# Patient Record
Sex: MALE | URBAN - METROPOLITAN AREA
[De-identification: names, ages, dates, MRNs, and addresses within clinical notes are randomized per-mention and may not be internally consistent; named-entity substitution may affect disease eponyms.]

---

## 2024-12-05 ENCOUNTER — TELEMEDICINE (OUTPATIENT)
Dept: PRIMARY CARE | Facility: CLINIC | Age: 44
End: 2024-12-05
Payer: COMMERCIAL

## 2024-12-05 DIAGNOSIS — Z75.8 DOES NOT HAVE PRIMARY CARE PROVIDER: ICD-10-CM

## 2024-12-05 DIAGNOSIS — J34.89 FRONTAL SINUS PAIN: ICD-10-CM

## 2024-12-05 DIAGNOSIS — J06.9 VIRAL UPPER RESPIRATORY INFECTION: Primary | ICD-10-CM

## 2024-12-05 PROCEDURE — 99204 OFFICE O/P NEW MOD 45 MIN: CPT | Performed by: NURSE PRACTITIONER

## 2024-12-05 RX ORDER — AMOXICILLIN AND CLAVULANATE POTASSIUM 875; 125 MG/1; MG/1
1 TABLET, FILM COATED ORAL 2 TIMES DAILY
Qty: 14 TABLET | Refills: 0 | Status: SHIPPED | OUTPATIENT
Start: 2024-12-05 | End: 2024-12-06

## 2024-12-05 RX ORDER — PREDNISONE 20 MG/1
40 TABLET ORAL DAILY
Qty: 10 TABLET | Refills: 0 | Status: SHIPPED | OUTPATIENT
Start: 2024-12-05 | End: 2024-12-10

## 2024-12-05 NOTE — PROGRESS NOTES
Subjective   Patient ID: Bon Castillo is a 44 y.o. male who presents for URI.  7-8 days HA frontal , associated with URI, feels full  Some chills but no T recorded.  ST in morning  No ear pain  Mucinex  Multi symptom - this morning  Evening using day/night combo meds  NO PCP.           URI   Associated symptoms include headaches (likely sinus) and a sore throat. Pertinent negatives include no coughing, sinus pain or wheezing.       Review of Systems   Constitutional:  Negative for fever.   HENT:  Positive for postnasal drip, sinus pressure and sore throat. Negative for sinus pain.    Respiratory:  Negative for cough, shortness of breath and wheezing.    Neurological:  Positive for headaches (likely sinus). Negative for dizziness, speech difficulty, weakness, light-headedness and numbness.       Objective   Physical Exam  Constitutional:       General: He is not in acute distress.     Appearance: He is not toxic-appearing.   HENT:      Nose: Congestion and rhinorrhea present.   Pulmonary:      Effort: Pulmonary effort is normal.   Musculoskeletal:      Cervical back: Neck supple.   Neurological:      General: No focal deficit present.      Mental Status: He is oriented to person, place, and time.      Comments: grossly   Psychiatric:         Behavior: Behavior normal.         Assessment/Plan   Diagnoses and all orders for this visit:  Viral upper respiratory infection  -     predniSONE (Deltasone) 20 mg tablet; Take 2 tablets (40 mg) by mouth once daily for 5 days.  Frontal sinus pain  Does not have primary care provider  -     Referral to Primary Care; Future           ERNESTO Liu 12/05/24 12:01 PM

## 2024-12-12 DIAGNOSIS — J34.89 FRONTAL SINUS PAIN: ICD-10-CM

## 2024-12-12 RX ORDER — DOXYCYCLINE 100 MG/1
100 CAPSULE ORAL 2 TIMES DAILY
Qty: 14 CAPSULE | Refills: 0 | Status: SHIPPED | OUTPATIENT
Start: 2024-12-12 | End: 2024-12-19

## 2025-01-05 PROBLEM — J06.9 VIRAL UPPER RESPIRATORY INFECTION: Status: ACTIVE | Noted: 2025-01-05

## 2025-01-05 PROBLEM — Z75.8 DOES NOT HAVE PRIMARY CARE PROVIDER: Status: ACTIVE | Noted: 2025-01-05

## 2025-01-05 PROBLEM — J34.89 FRONTAL SINUS PAIN: Status: ACTIVE | Noted: 2025-01-05

## 2025-01-05 ASSESSMENT — ENCOUNTER SYMPTOMS
WEAKNESS: 0
COUGH: 0
LIGHT-HEADEDNESS: 0
SORE THROAT: 1
HEADACHES: 1
SINUS PAIN: 0
NUMBNESS: 0
DIZZINESS: 0
SINUS PRESSURE: 1
SHORTNESS OF BREATH: 0
SPEECH DIFFICULTY: 0
WHEEZING: 0
FEVER: 0

## 2025-01-05 NOTE — ASSESSMENT & PLAN NOTE
Hx and limited exam are c/w focal sinus pain associated with URI.  Reviewed typical course versus symptoms to report.  Decongestant such as Mucinex -D during daytime can be helpful versus 4 hours meds, be careful not to duplicate ingredients.  Fluids.  Reviewed r/b/a of med use, symptoms to report.  Schedule visit to get estabished with PCP as soon as possible, referral made